# Patient Record
Sex: FEMALE | Race: WHITE | Employment: FULL TIME | URBAN - METROPOLITAN AREA
[De-identification: names, ages, dates, MRNs, and addresses within clinical notes are randomized per-mention and may not be internally consistent; named-entity substitution may affect disease eponyms.]

---

## 2017-02-11 ENCOUNTER — HOSPITAL ENCOUNTER (EMERGENCY)
Age: 22
Discharge: PSYCHIATRIC OR SUBSTANCE USE FACILITY | End: 2017-02-11
Attending: EMERGENCY MEDICINE
Payer: MEDICAID

## 2017-02-11 VITALS
RESPIRATION RATE: 14 BRPM | TEMPERATURE: 98.4 F | OXYGEN SATURATION: 99 % | HEART RATE: 104 BPM | DIASTOLIC BLOOD PRESSURE: 85 MMHG | SYSTOLIC BLOOD PRESSURE: 131 MMHG

## 2017-02-11 DIAGNOSIS — Z72.89 DELIBERATE SELF-CUTTING: ICD-10-CM

## 2017-02-11 DIAGNOSIS — F10.929 ALCOHOL INTOXICATION, WITH UNSPECIFIED COMPLICATION (HCC): ICD-10-CM

## 2017-02-11 DIAGNOSIS — R45.851 SUICIDE IDEATION: Primary | ICD-10-CM

## 2017-02-11 LAB
ALBUMIN SERPL BCP-MCNC: 3.8 G/DL (ref 3.4–5)
ALBUMIN/GLOB SERPL: 1.1 {RATIO} (ref 0.8–1.7)
ALP SERPL-CCNC: 119 U/L (ref 45–117)
ALT SERPL-CCNC: 21 U/L (ref 13–56)
AMPHET UR QL SCN: NEGATIVE
ANION GAP BLD CALC-SCNC: 10 MMOL/L (ref 3–18)
APPEARANCE UR: CLEAR
AST SERPL W P-5'-P-CCNC: 10 U/L (ref 15–37)
BACTERIA URNS QL MICRO: NEGATIVE /HPF
BARBITURATES UR QL SCN: NEGATIVE
BASOPHILS # BLD AUTO: 0 K/UL (ref 0–0.06)
BASOPHILS # BLD: 0 % (ref 0–2)
BENZODIAZ UR QL: NEGATIVE
BILIRUB SERPL-MCNC: 0.1 MG/DL (ref 0.2–1)
BILIRUB UR QL: NEGATIVE
BUN SERPL-MCNC: 7 MG/DL (ref 7–18)
BUN/CREAT SERPL: 10 (ref 12–20)
CALCIUM SERPL-MCNC: 9 MG/DL (ref 8.5–10.1)
CANNABINOIDS UR QL SCN: NEGATIVE
CHLORIDE SERPL-SCNC: 108 MMOL/L (ref 100–108)
CO2 SERPL-SCNC: 24 MMOL/L (ref 21–32)
COCAINE UR QL SCN: NEGATIVE
COLOR UR: YELLOW
CREAT SERPL-MCNC: 0.68 MG/DL (ref 0.6–1.3)
DIFFERENTIAL METHOD BLD: ABNORMAL
EOSINOPHIL # BLD: 0.1 K/UL (ref 0–0.4)
EOSINOPHIL NFR BLD: 1 % (ref 0–5)
EPITH CASTS URNS QL MICRO: NORMAL /LPF (ref 0–5)
ERYTHROCYTE [DISTWIDTH] IN BLOOD BY AUTOMATED COUNT: 15.2 % (ref 11.6–14.5)
ETHANOL SERPL-MCNC: 192 MG/DL (ref 0–3)
GLOBULIN SER CALC-MCNC: 3.4 G/DL (ref 2–4)
GLUCOSE SERPL-MCNC: 106 MG/DL (ref 74–99)
GLUCOSE UR STRIP.AUTO-MCNC: NEGATIVE MG/DL
HCG UR QL: NEGATIVE
HCT VFR BLD AUTO: 40.2 % (ref 35–45)
HDSCOM,HDSCOM: NORMAL
HGB BLD-MCNC: 13.4 G/DL (ref 12–16)
HGB UR QL STRIP: ABNORMAL
KETONES UR QL STRIP.AUTO: NEGATIVE MG/DL
LEUKOCYTE ESTERASE UR QL STRIP.AUTO: NEGATIVE
LYMPHOCYTES # BLD AUTO: 22 % (ref 21–52)
LYMPHOCYTES # BLD: 3.3 K/UL (ref 0.9–3.6)
MCH RBC QN AUTO: 27.3 PG (ref 24–34)
MCHC RBC AUTO-ENTMCNC: 33.3 G/DL (ref 31–37)
MCV RBC AUTO: 81.9 FL (ref 74–97)
METHADONE UR QL: NEGATIVE
MONOCYTES # BLD: 1 K/UL (ref 0.05–1.2)
MONOCYTES NFR BLD AUTO: 7 % (ref 3–10)
NEUTS SEG # BLD: 10.8 K/UL (ref 1.8–8)
NEUTS SEG NFR BLD AUTO: 70 % (ref 40–73)
NITRITE UR QL STRIP.AUTO: NEGATIVE
OPIATES UR QL: NEGATIVE
PCP UR QL: NEGATIVE
PH UR STRIP: 5.5 [PH] (ref 5–8)
PLATELET # BLD AUTO: 280 K/UL (ref 135–420)
PMV BLD AUTO: 10.3 FL (ref 9.2–11.8)
POTASSIUM SERPL-SCNC: 4.1 MMOL/L (ref 3.5–5.5)
PROT SERPL-MCNC: 7.2 G/DL (ref 6.4–8.2)
PROT UR STRIP-MCNC: NEGATIVE MG/DL
RBC # BLD AUTO: 4.91 M/UL (ref 4.2–5.3)
RBC #/AREA URNS HPF: NORMAL /HPF (ref 0–5)
SODIUM SERPL-SCNC: 142 MMOL/L (ref 136–145)
SP GR UR REFRACTOMETRY: 1.01 (ref 1–1.03)
UROBILINOGEN UR QL STRIP.AUTO: 0.2 EU/DL (ref 0.2–1)
WBC # BLD AUTO: 15.3 K/UL (ref 4.6–13.2)
WBC URNS QL MICRO: NORMAL /HPF (ref 0–4)

## 2017-02-11 PROCEDURE — 74011250637 HC RX REV CODE- 250/637: Performed by: EMERGENCY MEDICINE

## 2017-02-11 PROCEDURE — 85025 COMPLETE CBC W/AUTO DIFF WBC: CPT | Performed by: EMERGENCY MEDICINE

## 2017-02-11 PROCEDURE — 99285 EMERGENCY DEPT VISIT HI MDM: CPT

## 2017-02-11 PROCEDURE — 81001 URINALYSIS AUTO W/SCOPE: CPT | Performed by: EMERGENCY MEDICINE

## 2017-02-11 PROCEDURE — 81025 URINE PREGNANCY TEST: CPT | Performed by: EMERGENCY MEDICINE

## 2017-02-11 PROCEDURE — 74011250637 HC RX REV CODE- 250/637

## 2017-02-11 PROCEDURE — 74011000250 HC RX REV CODE- 250

## 2017-02-11 PROCEDURE — 80307 DRUG TEST PRSMV CHEM ANLYZR: CPT | Performed by: EMERGENCY MEDICINE

## 2017-02-11 PROCEDURE — 80053 COMPREHEN METABOLIC PANEL: CPT | Performed by: EMERGENCY MEDICINE

## 2017-02-11 RX ORDER — ONDANSETRON 4 MG/1
TABLET, ORALLY DISINTEGRATING ORAL
Status: COMPLETED
Start: 2017-02-11 | End: 2017-02-11

## 2017-02-11 RX ORDER — ONDANSETRON 4 MG/1
4 TABLET, ORALLY DISINTEGRATING ORAL
Status: COMPLETED | OUTPATIENT
Start: 2017-02-11 | End: 2017-02-11

## 2017-02-11 RX ORDER — ONDANSETRON 2 MG/ML
4 INJECTION INTRAMUSCULAR; INTRAVENOUS
Status: DISCONTINUED | OUTPATIENT
Start: 2017-02-11 | End: 2017-02-11

## 2017-02-11 RX ORDER — IBUPROFEN 200 MG
1 TABLET ORAL DAILY
Status: DISCONTINUED | OUTPATIENT
Start: 2017-02-11 | End: 2017-02-11 | Stop reason: HOSPADM

## 2017-02-11 RX ORDER — BACITRACIN 500 UNIT/G
1 PACKET (EA) TOPICAL DAILY
Status: DISCONTINUED | OUTPATIENT
Start: 2017-02-11 | End: 2017-02-11 | Stop reason: HOSPADM

## 2017-02-11 RX ORDER — ACETAMINOPHEN 500 MG
1000 TABLET ORAL
Status: COMPLETED | OUTPATIENT
Start: 2017-02-11 | End: 2017-02-11

## 2017-02-11 RX ORDER — BACITRACIN 500 UNIT/G
PACKET (EA) TOPICAL
Status: COMPLETED
Start: 2017-02-11 | End: 2017-02-11

## 2017-02-11 RX ADMIN — Medication 1 PACKET: at 14:00

## 2017-02-11 RX ADMIN — ONDANSETRON 4 MG: 4 TABLET, ORALLY DISINTEGRATING ORAL at 13:29

## 2017-02-11 RX ADMIN — ACETAMINOPHEN 1000 MG: 500 TABLET ORAL at 14:00

## 2017-02-11 RX ADMIN — BACITRACIN ZINC 1 PACKET: 500 OINTMENT TOPICAL at 14:00

## 2017-02-11 RX ADMIN — ACETAMINOPHEN 1000 MG: 500 TABLET ORAL at 03:59

## 2017-02-11 NOTE — ED NOTES
Purposeful rounding completed:     Side rails up x 2: YES  Bed in low position and wheels locked: YES  Call bell within reach: YES  Comfort addressed: YES   Toileting needs addressed: YES  Plan of care reviewed/updated with patient and or family members: YES  IV site assessed: N/A  Pain assessed and addressed: YES, 0

## 2017-02-11 NOTE — ED NOTES
7:08 AM I assumed care of the patient from [address] [place] [address]  due to shift change. Patient is pending psychiatric evaluation. 1:50 PM Dr. Alysia Persaud (psychiatry) has agreed to accept the patient. Vitals:  Patient Vitals for the past 12 hrs:   Temp Pulse Resp BP SpO2   02/11/17 0333 97.3 °F (36.3 °C) 99 18 128/78 95 %   SpO2 reviewed and within normal limits.       Medications ordered:   Medications   acetaminophen (TYLENOL) tablet 1,000 mg (1,000 mg Oral Given 2/11/17 8547)         Lab findings:  Recent Results (from the past 12 hour(s))   URINALYSIS W/ RFLX MICROSCOPIC    Collection Time: 02/11/17  3:34 AM   Result Value Ref Range    Color YELLOW      Appearance CLEAR      Specific gravity 1.009 1.005 - 1.030      pH (UA) 5.5 5.0 - 8.0      Protein NEGATIVE  NEG mg/dL    Glucose NEGATIVE  NEG mg/dL    Ketone NEGATIVE  NEG mg/dL    Bilirubin NEGATIVE  NEG      Blood MODERATE (A) NEG      Urobilinogen 0.2 0.2 - 1.0 EU/dL    Nitrites NEGATIVE  NEG      Leukocyte Esterase NEGATIVE  NEG     DRUG SCREEN, URINE    Collection Time: 02/11/17  3:34 AM   Result Value Ref Range    BENZODIAZEPINE NEGATIVE  NEG      BARBITURATES NEGATIVE  NEG      THC (TH-CANNABINOL) NEGATIVE  NEG      OPIATES NEGATIVE  NEG      PCP(PHENCYCLIDINE) NEGATIVE  NEG      COCAINE NEGATIVE  NEG      AMPHETAMINE NEGATIVE  NEG      METHADONE NEGATIVE       HDSCOM (NOTE)    URINE MICROSCOPIC ONLY    Collection Time: 02/11/17  3:34 AM   Result Value Ref Range    WBC 0 to 3 0 - 4 /hpf    RBC 0 to 3 0 - 5 /hpf    Epithelial cells 3+ 0 - 5 /lpf    Bacteria NEGATIVE  NEG /hpf   CBC WITH AUTOMATED DIFF    Collection Time: 02/11/17  3:50 AM   Result Value Ref Range    WBC 15.3 (H) 4.6 - 13.2 K/uL    RBC 4.91 4.20 - 5.30 M/uL    HGB 13.4 12.0 - 16.0 g/dL    HCT 40.2 35.0 - 45.0 %    MCV 81.9 74.0 - 97.0 FL    MCH 27.3 24.0 - 34.0 PG    MCHC 33.3 31.0 - 37.0 g/dL    RDW 15.2 (H) 11.6 - 14.5 %    PLATELET 876 056 - 696 K/uL    MPV 10.3 9.2 - 11.8 FL NEUTROPHILS 70 40 - 73 %    LYMPHOCYTES 22 21 - 52 %    MONOCYTES 7 3 - 10 %    EOSINOPHILS 1 0 - 5 %    BASOPHILS 0 0 - 2 %    ABS. NEUTROPHILS 10.8 (H) 1.8 - 8.0 K/UL    ABS. LYMPHOCYTES 3.3 0.9 - 3.6 K/UL    ABS. MONOCYTES 1.0 0.05 - 1.2 K/UL    ABS. EOSINOPHILS 0.1 0.0 - 0.4 K/UL    ABS. BASOPHILS 0.0 0.0 - 0.06 K/UL    DF AUTOMATED     METABOLIC PANEL, COMPREHENSIVE    Collection Time: 02/11/17  3:50 AM   Result Value Ref Range    Sodium 142 136 - 145 mmol/L    Potassium 4.1 3.5 - 5.5 mmol/L    Chloride 108 100 - 108 mmol/L    CO2 24 21 - 32 mmol/L    Anion gap 10 3.0 - 18 mmol/L    Glucose 106 (H) 74 - 99 mg/dL    BUN 7 7.0 - 18 MG/DL    Creatinine 0.68 0.6 - 1.3 MG/DL    BUN/Creatinine ratio 10 (L) 12 - 20      GFR est AA >60 >60 ml/min/1.73m2    GFR est non-AA >60 >60 ml/min/1.73m2    Calcium 9.0 8.5 - 10.1 MG/DL    Bilirubin, total 0.1 (L) 0.2 - 1.0 MG/DL    ALT (SGPT) 21 13 - 56 U/L    AST (SGOT) 10 (L) 15 - 37 U/L    Alk.  phosphatase 119 (H) 45 - 117 U/L    Protein, total 7.2 6.4 - 8.2 g/dL    Albumin 3.8 3.4 - 5.0 g/dL    Globulin 3.4 2.0 - 4.0 g/dL    A-G Ratio 1.1 0.8 - 1.7     ETHYL ALCOHOL    Collection Time: 02/11/17  3:50 AM   Result Value Ref Range    ALCOHOL(ETHYL),SERUM 192 (H) 0 - 3 MG/DL   HCG URINE, QL    Collection Time: 02/11/17  3:50 AM   Result Value Ref Range    HCG urine, Ql. NEGATIVE  NEG         Orders:  Orders Placed This Encounter    CBC WITH AUTOMATED DIFF     Standing Status:   Standing     Number of Occurrences:   1    METABOLIC PANEL, COMPREHENSIVE     Standing Status:   Standing     Number of Occurrences:   1    ETHYL ALCOHOL     Standing Status:   Standing     Number of Occurrences:   1    URINALYSIS W/ RFLX MICROSCOPIC     Standing Status:   Standing     Number of Occurrences:   1    Urine Drug Screen     Standing Status:   Standing     Number of Occurrences:   1    HCG URINE, QL     Standing Status:   Standing     Number of Occurrences:   1    URINE MICROSCOPIC ONLY Standing Status:   Standing     Number of Occurrences:   1    DIET REGULAR     Standing Status:   Standing     Number of Occurrences:   1     Order Specific Question:   Likes/Dislikes/Preferences     Answer:   finger foods only    SITTER     Standing Status:   Standing     Number of Occurrences:   1    IP CONSULT TO PSYCHIATRY     Standing Status:   Standing     Number of Occurrences:   1     Order Specific Question:   Reason for Consult: Answer:   suicide ideation     Order Specific Question:   Did you call or speak to the consulting provider? Answer:   No     Order Specific Question:   Consult To     Answer:   tele-psych    SUICIDE PRECAUTIONS     Standing Status:   Standing     Number of Occurrences:   1    acetaminophen (TYLENOL) tablet 1,000 mg       Disposition:  Diagnosis:   1. Suicide ideation    2. Alcohol intoxication, with unspecified complication ([address]    3. Deliberate self-cutting        Disposition:     Follow-up Information     None           Patient's Medications    No medications on file     [id number] [facility] for and in the presence of Reena Cooper MD (02/11/17)      Physician Attestation  I personally performed the services described in this documentation, reviewed and edited the documentation which was dictated to the scribe in my presence, and it accurately records my words and actions. Reena Cooper MD (02/11/17)      Signed by: Juancho Napier, February 11, 2017 at 7:13 AM           Pt accepted [facility] will transfer    Diagnosis:   1. Suicide ideation    2. Alcohol intoxication, with unspecified complication ([address]    3.  Deliberate self-cutting          Disposition: transfer    Follow-up Information     None          Patient's Medications    No medications on file

## 2017-02-11 NOTE — ED TRIAGE NOTES
Pt presents to ED after cutting lt wrist in attempt to kill self. Pt with superficial laceration to lt wrist. Pt tearful at time of triage.  Denies HI

## 2017-02-11 NOTE — ED NOTES
pts eugenia updated on pt condition and plan of care, notified that at this time no visitors for pt per house supervisor. [facility] desk aware. Kelle Gain phone # [phone] and notified would let him know if status changes.

## 2017-02-11 NOTE — ED NOTES
Purposeful rounding completed:    Side rails up x 2:  YES  Bed in low position and wheels locked: YES  Call bell within reach: YES  Comfort addressed: YES    Toileting needs addressed: YES  Plan of care reviewed/updated with patient and or family members: YES  IV site assessed: N/A  Pain assessed and addressed: YES, 8

## 2017-02-11 NOTE — BSMART NOTE
Hever De Santiago is a 22year old female with a long history of depression but no treatment in the last 2 2/12 years since leaving foster care as she was raised in foster care. At age 16 she was residentially psychiatrically hospitalized for 2 years in [place]. She has suffered many losses recently. In December, her grandfather passed away. She had a friend die in an accident since her grandfather's death and four days ago, her 24year old sister  by an overdose of morphine. She has been suicidal since her sister's death. She was drinking yesterday and decided to cut her wrist in an effort to kill herself. She has a supportive fiancee and an 7 month old son. Her fiancee is by her side at the hospital.  She states that she is very much depressed and presents with a depressed affect. She has not eating regularly and has not been able to sleep. She does want to get help and stabilized back on medications while in the safe environment of a psychiatric hospital.  She is pleasant and cooperative. Talked with Dr. Alona Mclaughlin who agrees that this patient would benefit from a voluntary psychiatric hospital admission. Efforts will be made to locate an accepting hospital for her. Ata Herrera.  Yg [place], [place]  Crisis Intervention

## 2017-02-11 NOTE — ED NOTES
Pt arrived in ED after making superficial cuts to lt wrist, no active bleeding upon arrival to ED. Pt states she wanted to kill herself \"to be with my sister that  recently\". Pt admits to having 3 beers and 3 shots of alcohol tonight.

## 2017-02-11 NOTE — ED NOTES
Purposeful rounding completed:    Side rails up x 2:  YES  Bed in low position and wheels locked: YES  Call bell within reach: YES  Comfort addressed: YES    Toileting needs addressed: YES  Plan of care reviewed/updated with patient and or family members: YES  IV site assessed: NA  Pain assessed and addressed: YES, 5

## 2017-02-11 NOTE — ED PROVIDER NOTES
HPI Comments: 3:37 AM Hever De Santiago is a 22 y.o. Female, with a hx of Migraines, who presents to the ED with complaints of SI. The pt states her grandfather passed away on December 21st, 2016, her sister passed away 4 days ago, and her best friend passed away in between those 2 time periods. Tonight, the pt tried to kill herself by cutting her wrists with a razor. She has a known hx of self harm due to SI. The pt admits to drinking etoh, prior to cutting herself, in which she states has no factor into her decision. She experiences a HA and has been crying. She denies use of illicit drugs. The pt currently admits SI. No further symptoms or complaints expressed at this time. Patient is a 22 y.o. female presenting with suicidal ideation. The history is provided by the patient. Suicidal   Associated symptoms include headaches. Pertinent negatives include no shortness of breath, no chest pain and no vomiting. Past Medical History:   Diagnosis Date    Migraine        Past Surgical History:   Procedure Laterality Date    Hx tonsil and adenoidectomy           History reviewed. No pertinent family history. Social History     Social History    Marital status: SINGLE     Spouse name: N/A    Number of children: N/A    Years of education: N/A     Occupational History    Not on file. Social History Main Topics    Smoking status: Current Every Day Smoker     Packs/day: 1.50    Smokeless tobacco: Not on file    Alcohol use Yes    Drug use: No    Sexual activity: Not on file     Other Topics Concern    Not on file     Social History Narrative    No narrative on file         ALLERGIES: Review of patient's allergies indicates no known allergies. Review of Systems   Constitutional: Negative for fever. HENT: Negative for trouble swallowing. Respiratory: Negative for shortness of breath. Cardiovascular: Negative for chest pain.    Gastrointestinal: Negative for abdominal pain, diarrhea and vomiting. Genitourinary: Negative for difficulty urinating. Musculoskeletal: Negative for back pain and neck pain. Skin: Negative for wound. Neurological: Positive for headaches. Negative for syncope. Psychiatric/Behavioral: Positive for self-injury and suicidal ideas. Negative for behavioral problems. All other systems reviewed and are negative. Vitals:    02/11/17 0333   BP: 128/78   Pulse: 99   Resp: 18   Temp: 97.3 °F (36.3 °C)   SpO2: 95%            Physical Exam   Constitutional: She is oriented to person, place, and time. No distress. Intoxicating-appearing, nad   HENT:   Head: Normocephalic and atraumatic. Eyes: EOM are normal.   Neck: Normal range of motion. Cardiovascular: Normal rate. Pulmonary/Chest: Effort normal and breath sounds normal. No respiratory distress. Abdominal: Soft. There is no tenderness. Musculoskeletal: Normal range of motion. Mechanically stable   Neurological: She is alert and oriented to person, place, and time. No focal deficits noted   Skin:   Superficial lacerations to the L wrist.   Psychiatric: Her behavior is normal. She expresses suicidal ideation. Nursing note and vitals reviewed. MDM  Number of Diagnoses or Management Options  Alcohol intoxication, with unspecified complication [facility]):   Deliberate self-cutting:   Suicide ideation:   Diagnosis management comments: 21 yo CF with PMHx depression presents by EMS with suicide ideation. Pt with superficial laceration to left wrist in suicide attempt. Examination otherwise unremarkable. Pt still actively suicidal.  No other symptoms, problems, or complaints. Pt will need psych evaluation    1.  Cbc, cmp, etoh  2.  Ua, uds, preg  3. Symptom management  4. Psych consult  5. Re-evaluate    5:02 AM  etoh 192, wbc mildly elevated, work-up otherwise unremarkable.   Pt is medically clear for psych eval.    6:25 AM  Care to be transferred to Dr. Artis Freitas at end of shift, psych consult pending.        Amount and/or Complexity of Data Reviewed  Clinical lab tests: ordered and reviewed  Obtain history from someone other than the patient: yes  Review and summarize past medical records: yes  Discuss the patient with other providers: yes  Independent visualization of images, tracings, or specimens: yes    Patient Progress  Patient progress: stable    ED Course       Procedures    Vitals:  Patient Vitals for the past 12 hrs:   Temp Pulse Resp BP SpO2   02/11/17 0333 97.3 °F (36.3 °C) 99 18 128/78 95 %       Medications ordered:   Medications   acetaminophen (TYLENOL) tablet 1,000 mg (1,000 mg Oral Given 2/11/17 1357)         Lab findings:  Recent Results (from the past 12 hour(s))   URINALYSIS W/ RFLX MICROSCOPIC    Collection Time: 02/11/17  3:34 AM   Result Value Ref Range    Color YELLOW      Appearance CLEAR      Specific gravity 1.009 1.005 - 1.030      pH (UA) 5.5 5.0 - 8.0      Protein NEGATIVE  NEG mg/dL    Glucose NEGATIVE  NEG mg/dL    Ketone NEGATIVE  NEG mg/dL    Bilirubin NEGATIVE  NEG      Blood MODERATE (A) NEG      Urobilinogen 0.2 0.2 - 1.0 EU/dL    Nitrites NEGATIVE  NEG      Leukocyte Esterase NEGATIVE  NEG     DRUG SCREEN, URINE    Collection Time: 02/11/17  3:34 AM   Result Value Ref Range    BENZODIAZEPINE NEGATIVE  NEG      BARBITURATES NEGATIVE  NEG      THC (TH-CANNABINOL) NEGATIVE  NEG      OPIATES NEGATIVE  NEG      PCP(PHENCYCLIDINE) NEGATIVE  NEG      COCAINE NEGATIVE  NEG      AMPHETAMINE NEGATIVE  NEG      METHADONE NEGATIVE       HDSCOM (NOTE)    URINE MICROSCOPIC ONLY    Collection Time: 02/11/17  3:34 AM   Result Value Ref Range    WBC 0 to 3 0 - 4 /hpf    RBC 0 to 3 0 - 5 /hpf    Epithelial cells 3+ 0 - 5 /lpf    Bacteria NEGATIVE  NEG /hpf   CBC WITH AUTOMATED DIFF    Collection Time: 02/11/17  3:50 AM   Result Value Ref Range    WBC 15.3 (H) 4.6 - 13.2 K/uL    RBC 4.91 4.20 - 5.30 M/uL    HGB 13.4 12.0 - 16.0 g/dL    HCT 40.2 35.0 - 45.0 %    MCV 81.9 74.0 - 97.0 FL MCH 27.3 24.0 - 34.0 PG    MCHC 33.3 31.0 - 37.0 g/dL    RDW 15.2 (H) 11.6 - 14.5 %    PLATELET 325 200 - 766 K/uL    MPV 10.3 9.2 - 11.8 FL    NEUTROPHILS 70 40 - 73 %    LYMPHOCYTES 22 21 - 52 %    MONOCYTES 7 3 - 10 %    EOSINOPHILS 1 0 - 5 %    BASOPHILS 0 0 - 2 %    ABS. NEUTROPHILS 10.8 (H) 1.8 - 8.0 K/UL    ABS. LYMPHOCYTES 3.3 0.9 - 3.6 K/UL    ABS. MONOCYTES 1.0 0.05 - 1.2 K/UL    ABS. EOSINOPHILS 0.1 0.0 - 0.4 K/UL    ABS. BASOPHILS 0.0 0.0 - 0.06 K/UL    DF AUTOMATED     METABOLIC PANEL, COMPREHENSIVE    Collection Time: 02/11/17  3:50 AM   Result Value Ref Range    Sodium 142 136 - 145 mmol/L    Potassium 4.1 3.5 - 5.5 mmol/L    Chloride 108 100 - 108 mmol/L    CO2 24 21 - 32 mmol/L    Anion gap 10 3.0 - 18 mmol/L    Glucose 106 (H) 74 - 99 mg/dL    BUN 7 7.0 - 18 MG/DL    Creatinine 0.68 0.6 - 1.3 MG/DL    BUN/Creatinine ratio 10 (L) 12 - 20      GFR est AA >60 >60 ml/min/1.73m2    GFR est non-AA >60 >60 ml/min/1.73m2    Calcium 9.0 8.5 - 10.1 MG/DL    Bilirubin, total 0.1 (L) 0.2 - 1.0 MG/DL    ALT (SGPT) 21 13 - 56 U/L    AST (SGOT) 10 (L) 15 - 37 U/L    Alk. phosphatase 119 (H) 45 - 117 U/L    Protein, total 7.2 6.4 - 8.2 g/dL    Albumin 3.8 3.4 - 5.0 g/dL    Globulin 3.4 2.0 - 4.0 g/dL    A-G Ratio 1.1 0.8 - 1.7     ETHYL ALCOHOL    Collection Time: 02/11/17  3:50 AM   Result Value Ref Range    ALCOHOL(ETHYL),SERUM 192 (H) 0 - 3 MG/DL   HCG URINE, QL    Collection Time: 02/11/17  3:50 AM   Result Value Ref Range    HCG urine, Ql. NEGATIVE  NEG         EKG interpretation by ED Physician:    X-Ray, CT or other radiology findings or impressions:  No orders to display       Disposition:  Diagnosis:   1. Suicide ideation    2. Alcohol intoxication, with unspecified complication ([facility] [address]    3. Deliberate self-cutting        Disposition: Pending    Follow-up Information     None           Patient's Medications    No medications on file     Scribe Attestation:   CARLOTTA AVILEZ am scribing for and in the presence of Marcial Suarez MD on this day 02/11/17 at 3:42 AM   sunitha Sloan    Signed by: Sunitha Sloan. 3:42 AM    Provider Attestation:  I personally performed the services described in the documentation, reviewed the documentation, as recorded by the scribe in my presence, and it accurately and completely records my words and actions.   Marcial Suarez MD. 3:42 AM

## 2020-03-03 NOTE — ED NOTES
Pt sleeping in nad RN spoke with pt advised that she has not been seen by PCP in over 1 year, PCP is no longer with [facility].  Advised she needs to schedule with a provider in order to receive additional refills.  Pt verbalized understanding and will call her insurance to see who is in her network and will schedule accordingly.